# Patient Record
Sex: FEMALE | Race: WHITE | NOT HISPANIC OR LATINO | Employment: FULL TIME | URBAN - METROPOLITAN AREA
[De-identification: names, ages, dates, MRNs, and addresses within clinical notes are randomized per-mention and may not be internally consistent; named-entity substitution may affect disease eponyms.]

---

## 2019-07-02 ENCOUNTER — OFFICE VISIT (OUTPATIENT)
Dept: PULMONOLOGY | Facility: CLINIC | Age: 54
End: 2019-07-02

## 2019-07-02 ENCOUNTER — DOCUMENTATION (OUTPATIENT)
Dept: ONCOLOGY | Facility: CLINIC | Age: 54
End: 2019-07-02

## 2019-07-02 VITALS
BODY MASS INDEX: 24.99 KG/M2 | WEIGHT: 150 LBS | OXYGEN SATURATION: 94 % | HEIGHT: 65 IN | SYSTOLIC BLOOD PRESSURE: 120 MMHG | HEART RATE: 62 BPM | TEMPERATURE: 97.7 F | DIASTOLIC BLOOD PRESSURE: 80 MMHG

## 2019-07-02 DIAGNOSIS — R91.1 NODULE OF RIGHT LUNG: ICD-10-CM

## 2019-07-02 DIAGNOSIS — R91.8 ABNORMAL CT LUNG SCREENING: Primary | ICD-10-CM

## 2019-07-02 PROCEDURE — 94726 PLETHYSMOGRAPHY LUNG VOLUMES: CPT | Performed by: INTERNAL MEDICINE

## 2019-07-02 PROCEDURE — 99204 OFFICE O/P NEW MOD 45 MIN: CPT | Performed by: INTERNAL MEDICINE

## 2019-07-02 PROCEDURE — 94729 DIFFUSING CAPACITY: CPT | Performed by: INTERNAL MEDICINE

## 2019-07-02 PROCEDURE — 94375 RESPIRATORY FLOW VOLUME LOOP: CPT | Performed by: INTERNAL MEDICINE

## 2019-07-02 RX ORDER — ESTRADIOL 0.1 MG/D
1 FILM, EXTENDED RELEASE TRANSDERMAL 2 TIMES WEEKLY
COMMUNITY
End: 2020-10-15

## 2019-07-02 RX ORDER — ALBUTEROL SULFATE 90 UG/1
AEROSOL, METERED RESPIRATORY (INHALATION)
COMMUNITY
Start: 2019-04-20 | End: 2023-03-03

## 2019-07-02 RX ORDER — MONTELUKAST SODIUM 10 MG/1
TABLET ORAL
COMMUNITY
Start: 2019-06-29 | End: 2020-10-15 | Stop reason: SDDI

## 2019-07-02 RX ORDER — TESTOSTERONE GEL, 1% 10 MG/G
50 GEL TRANSDERMAL DAILY
COMMUNITY
End: 2023-03-03

## 2019-07-02 NOTE — PROGRESS NOTES
Met patient in lung nodule clinic with Dr. Whiting. She has minimal smoking history and has not smoked in many years. She has had significant STAUFFER for several years now. States she cannot go up one flight of stairs without being SOA. She has family history of cardiac disease but none known in herself. CT chest done revealed 9mm right upper lobe nodule. Scans and PFT's reviewed. Per Dr. Whiting CT guided biopsy and echo. Patient requests biopsy to be done at [place], will try to accommodate. Introduced lung navigator role and provided contact information. She v/u and agreeable to plan.

## 2019-07-02 NOTE — PROGRESS NOTES
Shahida Cutler is a 53 y.o. female here for evaluation of   Chief Complaint   Patient presents with   • Shortness of Breath       Problem list:  1. Right upper lobe nodule, 0.9 cm  2. Former tobacco, quit 2011  3. Perimenopausal  4. Multiple food allergies  5. Obstructive sleep apnea on CPAP  6. History of muscle biopsy 2004  7. Radial keratotomy  8. Colonoscopy 2017  9. No known drug allergies    History of Present Illness    53-year-old woman referred to evaluate a right upper lobe lung nodule.  She has been short of breath for many years.  She saw Dr. Milan Dominguez around 2011 and she thinks underwent a cardiopulmonary exercise test.  Work-up was negative.  She quit smoking at that time after smoking 10 years, 1 pack/day.  She denies a chronic cough or wheezing.  She denies a history of TB exposure.  She has a radon gas detector in her basement and it is negative.  Her mother worked at a power plant that had asbestos but she is never been exposed.  She complains of dyspnea walking up one flight of stairs or doing light yard work.  She was given an inhaler and some Singulair without benefit.  She was a very active teenager able to do cheerleading.  There is a possible history of asthma as a teen.  She denies weight loss or fever.  She wears CPAP for sleep apnea without excessive daytime somnolence    Review of Systems   Respiratory: Positive for shortness of breath.    Musculoskeletal: Positive for back pain.   Allergic/Immunologic: Positive for environmental allergies.   All other systems reviewed and are negative.        Current Outpatient Medications:   •  estradiol (MINIVELLE, VIVELLE-DOT) 0.1 MG/24HR patch, Place 1 patch on the skin as directed by provider 2 (Two) Times a Week., Disp: , Rfl:   •  testosterone (ANDROGEL) 50 MG/5GM (1%) gel gel, Place 50 mg on the skin as directed by provider Daily., Disp: , Rfl:   •  albuterol sulfate  (90 Base) MCG/ACT inhaler, , Disp: , Rfl:   •  montelukast  "(SINGULAIR) 10 MG tablet, , Disp: , Rfl:   •  progesterone (PROMETRIUM) 100 MG capsule, Take 100 mg by mouth Daily., Disp: , Rfl: 0    Past Medical History:   Diagnosis Date   • Vision abnormalities      History reviewed. No pertinent surgical history.  Social History     Socioeconomic History   • Marital status:      Spouse name: Not on file   • Number of children: Not on file   • Years of education: Not on file   • Highest education level: Not on file   Tobacco Use   • Smoking status: Former Smoker     Packs/day: 1.00     Years: 10.00     Pack years: 10.00     Types: Cigarettes     Last attempt to quit: 2011     Years since quittin.5   • Smokeless tobacco: Never Used   Substance and Sexual Activity   • Alcohol use: Yes     Alcohol/week: 6.0 oz     Types: 10 Cans of beer per week     Frequency: Never   • Drug use: No   • Sexual activity: Defer     Family History   Problem Relation Age of Onset   • Hypertension Mother    • Heart failure Father    • Hypertension Sister    • Cancer Brother    • Hypertension Brother    • Hypertension Brother      Blood pressure 120/80, pulse 62, temperature 97.7 °F (36.5 °C), height 163.8 cm (64.5\"), weight 68 kg (150 lb), SpO2 94 %.    Physical Exam   Constitutional: She is oriented to person, place, and time. She appears well-developed and well-nourished. No distress.   HENT:   Head: Normocephalic and atraumatic.   Nose: Nose normal.   Mouth/Throat: Oropharynx is clear and moist. No oropharyngeal exudate.   Eyes: Pupils are equal, round, and reactive to light. No scleral icterus.   Neck: No JVD present. No tracheal deviation present. No thyromegaly present.   Cardiovascular: Normal rate, regular rhythm and normal heart sounds.   No murmur heard.  Pulmonary/Chest: Effort normal and breath sounds normal. No respiratory distress. She has no wheezes.   Abdominal: Soft. Bowel sounds are normal. She exhibits no distension. There is no tenderness.   Musculoskeletal: She " exhibits no edema.   Lymphadenopathy:     She has no cervical adenopathy.   Neurological: She is alert and oriented to person, place, and time.   Skin: Skin is warm and dry.   Psychiatric: She has a normal mood and affect.         PFTs:  FVC 2.91 L, 82%, FEV1 2.23 L, 79% ratio 77%, total lung capacity 3.87 L, 77%, diffusion capacity 17.3, 81%    Radiology:  CT scan of the chest June 19, 2019 and [place] 0.9 cm nodule right upper lobe, bulky calcified adenopathy in the mediastinum and hilum, scattered calcified granulomas  Lab:    Shahida was seen today for shortness of breath.    Diagnoses and all orders for this visit:    Abnormal CT lung screening  -     Pulmonary Function Test    Nodule of right lung, RUL        Discussion:   #1 right upper lobe nodule, 0.9 cm.  This is likely a granuloma however it is noncalcified and she is a former smoker.  It is also located in the upper lobe.  Therefore we should probably proceed with needle biopsy.  She clearly has signs of granulomatous disease with densely calcified lymph nodes and some scattered calcified granulomas but none this size.  It does not have the configuration of a hamartoma.  She does not have a personal history of cancer so a solitary metastatic lesion would be unlikely.    #2 shortness of air with exertion, I do not see fibrosing mediastinitis on her CT scan but that is in the differential.  She has not had an echocardiogram to evaluate LV function.  Pulmonary function tests are unrevealing.  She likely needs a work-up for pulmonary hypertension      CBC, pro time, BMP  Schedule transthoracic needle biopsy  Echocardiogram to evaluate dyspnea  Follow-up 1 week post biopsy    Rosalina Whiting MD

## 2019-07-05 ENCOUNTER — PREP FOR SURGERY (OUTPATIENT)
Dept: OTHER | Facility: HOSPITAL | Age: 54
End: 2019-07-05

## 2019-07-05 DIAGNOSIS — R06.09 DOE (DYSPNEA ON EXERTION): Primary | ICD-10-CM

## 2019-07-05 DIAGNOSIS — R91.1 LUNG NODULE: Primary | ICD-10-CM

## 2019-07-08 DIAGNOSIS — R91.1 NODULE OF RIGHT LUNG: Primary | ICD-10-CM

## 2019-07-08 DIAGNOSIS — R91.8 ABNORMAL CT LUNG SCREENING: ICD-10-CM

## 2019-07-09 ENCOUNTER — APPOINTMENT (OUTPATIENT)
Dept: CARDIOLOGY | Facility: HOSPITAL | Age: 54
End: 2019-07-09

## 2019-07-10 ENCOUNTER — TELEPHONE (OUTPATIENT)
Dept: PULMONOLOGY | Facility: CLINIC | Age: 54
End: 2019-07-10

## 2019-07-10 NOTE — TELEPHONE ENCOUNTER
Patient requested Trans Echo & CT Needle Biopsy be done @ [facility]. Dr Smith wants to see her back 1 week after CT. When trying to schedule the follow up appointment, the patient refused to see an APRN, stating that her condition warrants seeing the Dr, not a PA. It was explained that once a patient is seen by a Dr in our office, that is the Dr who continues to care for them. She was advised that our APRN's work very closely with the Drs and they would take good care of her. Patient still refused to schedule follow up with APRN. She was willing to schedule the next available appointment with Dr Smith in Sept. She was added to the wait list, in case something opens up sooner. Sent in basket message to Dr Smith for further advise.

## 2019-07-19 DIAGNOSIS — R06.09 DOE (DYSPNEA ON EXERTION): ICD-10-CM

## 2019-07-19 DIAGNOSIS — R91.1 LUNG NODULE: ICD-10-CM

## 2019-07-25 ENCOUNTER — TELEPHONE (OUTPATIENT)
Dept: PULMONOLOGY | Facility: CLINIC | Age: 54
End: 2019-07-25

## 2019-07-25 NOTE — TELEPHONE ENCOUNTER
Patient called requesting results of needle biopsy and echocardiogram, please advise at your convenience.

## 2019-09-17 ENCOUNTER — TELEPHONE (OUTPATIENT)
Dept: PULMONOLOGY | Facility: CLINIC | Age: 54
End: 2019-09-17

## 2019-10-01 ENCOUNTER — OFFICE VISIT (OUTPATIENT)
Dept: PULMONOLOGY | Facility: CLINIC | Age: 54
End: 2019-10-01

## 2019-10-01 VITALS
SYSTOLIC BLOOD PRESSURE: 102 MMHG | BODY MASS INDEX: 25.06 KG/M2 | TEMPERATURE: 97.8 F | WEIGHT: 150.4 LBS | OXYGEN SATURATION: 95 % | HEIGHT: 65 IN | DIASTOLIC BLOOD PRESSURE: 70 MMHG | HEART RATE: 62 BPM

## 2019-10-01 DIAGNOSIS — R06.09 DOE (DYSPNEA ON EXERTION): ICD-10-CM

## 2019-10-01 DIAGNOSIS — R91.1 NODULE OF RIGHT LUNG: Primary | ICD-10-CM

## 2019-10-01 PROCEDURE — 99213 OFFICE O/P EST LOW 20 MIN: CPT | Performed by: INTERNAL MEDICINE

## 2019-10-01 RX ORDER — ESTRADIOL 0.07 MG/D
FILM, EXTENDED RELEASE TRANSDERMAL
Refills: 5 | COMMUNITY
Start: 2019-09-19 | End: 2020-10-15 | Stop reason: ALTCHOICE

## 2019-10-01 NOTE — PROGRESS NOTES
Shahida Cutler is a 54 y.o. female here for evaluation of lung nodule      Problem list:  1. Right upper lobe nodule, 0.9 cm  2. Transthoracic needle biopsy right upper lobe nodule, July 2019, rare benign bronchial cells and macrophages  3. Former tobacco, quit 2011  4. Perimenopausal  5. Multiple food allergies  6. Obstructive sleep apnea on CPAP  7. History of muscle biopsy 2004  8. Radial keratotomy  9. Colonoscopy 2017  10. No known drug allergies    History of Present Illness    54-year-old woman referred for a right upper lobe nodule.  She underwent a transthoracic needle biopsy and.  Follow-up CT scan is stable.  She has a remote history of smoking in 2011.  She does not have wheezing but complains of exertional dyspnea.  11 she underwent a cardiopulmonary exercise test that was negative.  She has a known history of sleep apnea for which she wears CPAP nightly with good symptom control.  Has no excessive somnolent rested in the mornings.  Exercise is limited by lumbar disc disease    Review of Systems   Respiratory: Positive for apnea and shortness of breath.    Musculoskeletal: Positive for back pain.   Allergic/Immunologic: Positive for environmental allergies and food allergies.         Current Outpatient Medications:   •  albuterol sulfate  (90 Base) MCG/ACT inhaler, , Disp: , Rfl:   •  estradiol (MINIVELLE, VIVELLE-DOT) 0.1 MG/24HR patch, Place 1 patch on the skin as directed by provider 2 (Two) Times a Week., Disp: , Rfl:   •  montelukast (SINGULAIR) 10 MG tablet, , Disp: , Rfl:   •  progesterone (PROMETRIUM) 100 MG capsule, Take 100 mg by mouth Daily., Disp: , Rfl: 0  •  testosterone (ANDROGEL) 50 MG/5GM (1%) gel gel, Place 50 mg on the skin as directed by provider Daily., Disp: , Rfl:   •  estradiol (MINIVELLE, VIVELLE-DOT) 0.075 MG/24HR patch, APPLY 0.25g (ONE click) EVERY DAY AS DIRECTED, Disp: , Rfl: 5    Past Medical History:   Diagnosis Date   • H/O degenerative disc disease    • Hot flash  "not due to menopause    • Multiple food allergies    • MAE on CPAP    • Vision abnormalities      Past Surgical History:   Procedure Laterality Date   • COLONOSCOPY     • EYE RADIAL KERATOTOMY     • MUSCLE BIOPSY       Social History     Socioeconomic History   • Marital status:      Spouse name: Not on file   • Number of children: 0   • Years of education: Not on file   • Highest education level: Not on file   Occupational History   • Occupation: realator administration   Tobacco Use   • Smoking status: Former Smoker     Packs/day: 1.00     Years: 10.00     Pack years: 10.00     Types: Cigarettes     Last attempt to quit: 2011     Years since quittin.7   • Smokeless tobacco: Never Used   Substance and Sexual Activity   • Alcohol use: Yes     Alcohol/week: 6.0 oz     Types: 10 Cans of beer per week     Frequency: Never   • Drug use: No   • Sexual activity: Defer     Family History   Problem Relation Age of Onset   • Hypertension Mother    • Heart failure Father    • Hypertension Sister    • Cancer Brother    • Hypertension Brother    • Hypertension Brother      Blood pressure 102/70, pulse 62, temperature 97.8 °F (36.6 °C), height 163.8 cm (64.5\"), weight 68.2 kg (150 lb 6.4 oz), SpO2 95 %.    Physical Exam   Constitutional: She is oriented to person, place, and time. She appears well-developed and well-nourished. No distress.   HENT:   Head: Normocephalic and atraumatic.   Mouth/Throat: Oropharynx is clear and moist. No oropharyngeal exudate.   Eyes: EOM are normal. Pupils are equal, round, and reactive to light. No scleral icterus.   Neck: No JVD present. No tracheal deviation present. No thyromegaly present.   Cardiovascular: Normal rate, regular rhythm and normal heart sounds.   No murmur heard.  Pulmonary/Chest: Effort normal and breath sounds normal. No respiratory distress. She has no wheezes.   Abdominal: Soft. Bowel sounds are normal. There is no tenderness.   Musculoskeletal: She " exhibits no edema.   Lymphadenopathy:     She has no cervical adenopathy.   Neurological: She is alert and oriented to person, place, and time.   Skin: Skin is warm and dry.   Psychiatric: She has a normal mood and affect.         PFTs:    Radiology:    Echocardiogram done at [facility] normal left ventricular ejection fraction 55%, RV systolic pressure 21.8, aortic valve normal, trace mitral regurgitation    Lab:  CT-guided needle biopsy right upper lobe nodule, [facility], July 2019 pathology rare bronchial cells and macrophages,      Diagnoses and all orders for this visit:    Nodule of right lung, RUL    STAUFFER (dyspnea on exertion)        Discussion:   #1 right upper lobe nodule, benign needle biopsy.  Needle biopsies can missed 2 to 5% to keep an eye on it with an annual CT scan.  He does have evidence of granulomatous disease and I clinically suspect a noncalcified granuloma.    #2 exertional dyspnea, unchanged but not improved.  Pulmonary function test showed no obstruction and CT scan shows no interstitial disease.  Echocardiogram shows normal left ventricular function and no pulmonary hypertension or valvular disease.  Therefore I clinically suspect deconditioning.    Follow-up in 1 year with CT scan of the chest    Rosalina Whiting MD

## 2020-05-22 DIAGNOSIS — R91.1 NODULE OF RIGHT LUNG: Primary | ICD-10-CM

## 2020-09-16 DIAGNOSIS — R91.1 NODULE OF RIGHT LUNG: ICD-10-CM

## 2020-09-25 ENCOUNTER — TELEPHONE (OUTPATIENT)
Dept: PULMONOLOGY | Facility: CLINIC | Age: 55
End: 2020-09-25

## 2020-10-15 ENCOUNTER — OFFICE VISIT (OUTPATIENT)
Dept: PULMONOLOGY | Facility: CLINIC | Age: 55
End: 2020-10-15

## 2020-10-15 VITALS
HEART RATE: 74 BPM | HEIGHT: 65 IN | TEMPERATURE: 97.8 F | OXYGEN SATURATION: 98 % | SYSTOLIC BLOOD PRESSURE: 128 MMHG | BODY MASS INDEX: 26.16 KG/M2 | WEIGHT: 157 LBS | DIASTOLIC BLOOD PRESSURE: 80 MMHG

## 2020-10-15 DIAGNOSIS — R06.09 DOE (DYSPNEA ON EXERTION): ICD-10-CM

## 2020-10-15 DIAGNOSIS — Z00.6 EXAMINATION FOR NORMAL COMPARISON FOR CLINICAL RESEARCH: Primary | ICD-10-CM

## 2020-10-15 DIAGNOSIS — R91.1 NODULE OF RIGHT LUNG: Primary | ICD-10-CM

## 2020-10-15 PROCEDURE — 99213 OFFICE O/P EST LOW 20 MIN: CPT | Performed by: INTERNAL MEDICINE

## 2020-10-15 RX ORDER — METHOCARBAMOL 750 MG/1
750 TABLET, FILM COATED ORAL 4 TIMES DAILY PRN
COMMUNITY

## 2020-10-15 RX ORDER — LEVOTHYROXINE SODIUM 0.03 MG/1
TABLET ORAL
COMMUNITY
Start: 2020-10-09

## 2020-10-15 RX ORDER — ACYCLOVIR 200 MG/1
CAPSULE ORAL DAILY PRN
COMMUNITY

## 2020-10-15 NOTE — PROGRESS NOTES
Shahida Cutler is a 55 y.o. female here for evaluation of       Problem list:  1. Right upper lobe nodule, 0.9 cm  2. Former tobacco, quit 2011  3. Hypothyroid  4. dyslipidemia  5. Perimenopausal  6. Multiple food allergies  7. Obstructive sleep apnea on CPAP  8. History of muscle biopsy 2004  9. Radial keratotomy  10. Colonoscopy 2017  11. No known drug allergies    History of Present Illness    55-year-old woman seen 1 year ago for right upper lobe nodule and some exertional dyspnea.  She had a extensive work-up for shortness of air in 2011 by Dr. Milan Dominguez including a CPX that was negative.  She did quit smoking cigarettes at that time after smoking 1 pack/day for 10 years.  She does wear CPAP for sleep apnea.  She did undergo a needle biopsy of the right upper lobe that was negative for cancer in 2019.  She continues to feel some shortness of air with exertion.  She feels as if her chest is constricted when she exerts.  It is no worse but it is also no better.  She is was recently started on some thyroid replacement because of fatigue and weight gain.  She gets shortness of air if she is exposed to perfumes, chemical smells.    Review of Systems   Constitutional: Positive for activity change and fatigue.   Respiratory: Positive for shortness of breath. Negative for cough and wheezing.    Cardiovascular: Negative for chest pain.   Psychiatric/Behavioral: Positive for sleep disturbance.         Current Outpatient Medications:   •  acyclovir (ZOVIRAX) 200 MG capsule, Take  by mouth Daily As Needed., Disp: , Rfl:   •  albuterol sulfate  (90 Base) MCG/ACT inhaler, , Disp: , Rfl:   •  levothyroxine (SYNTHROID, LEVOTHROID) 25 MCG tablet, , Disp: , Rfl:   •  methocarbamol (ROBAXIN) 750 MG tablet, Take 750 mg by mouth 4 (Four) Times a Day As Needed for Muscle Spasms., Disp: , Rfl:   •  testosterone (ANDROGEL) 50 MG/5GM (1%) gel gel, Place 50 mg on the skin as directed by provider Daily., Disp: , Rfl:  "    Past Medical History:   Diagnosis Date   • H/O degenerative disc disease    • Hot flash not due to menopause    • Multiple food allergies    • MAE on CPAP    • Vision abnormalities      Past Surgical History:   Procedure Laterality Date   • COLONOSCOPY     • EYE RADIAL KERATOTOMY     • MUSCLE BIOPSY       Social History     Socioeconomic History   • Marital status:      Spouse name: Not on file   • Number of children: 0   • Years of education: Not on file   • Highest education level: Not on file   Occupational History   • Occupation: realator administration   Tobacco Use   • Smoking status: Former Smoker     Packs/day: 1.00     Years: 10.00     Pack years: 10.00     Types: Cigarettes     Quit date: 2011     Years since quittin.7   • Smokeless tobacco: Never Used   Substance and Sexual Activity   • Alcohol use: Yes     Alcohol/week: 10.0 standard drinks     Types: 10 Cans of beer per week     Frequency: Never   • Drug use: No   • Sexual activity: Defer     Family History   Problem Relation Age of Onset   • Hypertension Mother    • Heart failure Father    • Hypertension Sister    • Cancer Brother    • Hypertension Brother    • Hypertension Brother      Blood pressure 128/80, pulse 74, temperature 97.8 °F (36.6 °C), height 163.8 cm (64.5\"), weight 71.2 kg (157 lb), SpO2 98 %, not currently breastfeeding.    Physical Exam  Constitutional:       Appearance: Normal appearance.   HENT:      Head: Normocephalic and atraumatic.      Nose:      Comments: masked  Eyes:      Extraocular Movements: Extraocular movements intact.      Pupils: Pupils are equal, round, and reactive to light.   Cardiovascular:      Rate and Rhythm: Normal rate and regular rhythm.      Heart sounds: Normal heart sounds. No murmur.   Pulmonary:      Effort: Pulmonary effort is normal.      Breath sounds: No rhonchi or rales.      Comments: Some upper airway wheeze with forced expirations  Abdominal:      General: Bowel sounds " are normal. There is no distension.      Palpations: Abdomen is soft.      Tenderness: There is no abdominal tenderness.   Skin:     General: Skin is warm and dry.   Psychiatric:         Mood and Affect: Mood normal.           PFTs:  July 2019, FVC 2.91 L, 82%, FEV1 2.23 L, 79% ratio 77%, total lung capacity 3.87 L, 77%, diffusion capacity 17.3, 81%    Radiology:  September 14, 2020, CT scan, [facility], nodules in the right upper lobe consistent with old granulomatous disease, no new nodules.  Persistent mediastinal adenopathy in the AP window.    Echocardiogram performed at [facility] July 18, 2019, normal left ventricular size and thickness.  EF 55%, RV systolic pressure 21.8, normal-appearing valves with trace mitral regurgitation, no aortic valve disease, no pericardial effusion    Lab:  July 28, 2020, cholesterol 201, triglycerides 64, , HDL 78  Glucose 96, BUN 14, creatinine 1.1, sodium 142, potassium 4.5, liver function test normal  White count 5.7, hemoglobin 13.4, platelet count 206, absolute eosinophil count 188    Needle biopsy right upper lobe nodule, rare bronchial cells and macrophages, no malignancy    Diagnoses and all orders for this visit:    1. Nodule of right lung, RUL (Primary)    2. STAUFFER (dyspnea on exertion)        Discussion:     #1 dyspnea on exertion, previous pulmonary function tests have been normal.  I suspect that she may simply have deconditioning and we discussed starting an exercise program.  She did have an echocardiogram a year ago that revealed normal left ventricular function with an EF of 55% and no significant valvular disease.  CT scan of the chest reveals calcified granulomatous changes in the right upper lobe as well as some mediastinal adenopathy in the left aortopulmonary window.  These findings were stable compared to previous scans.  She does have difficulty around perfumes, scented candles, chemical smells.  This might indicate vocal  cord dysfunction and if she has persistent symptoms she might benefit from speech therapy evaluation.  With stability of her CT scan over 2 years and no change in her symptoms I think I can follow her up on an as-needed basis.    Rosalina Whiting MD

## 2023-02-22 DIAGNOSIS — Z00.6 EXAMINATION FOR NORMAL COMPARISON OR CONTROL IN CLINICAL RESEARCH: Primary | ICD-10-CM

## 2023-03-03 ENCOUNTER — OFFICE VISIT (OUTPATIENT)
Dept: PULMONOLOGY | Facility: CLINIC | Age: 58
End: 2023-03-03
Payer: COMMERCIAL

## 2023-03-03 VITALS
BODY MASS INDEX: 26.16 KG/M2 | TEMPERATURE: 96.4 F | SYSTOLIC BLOOD PRESSURE: 120 MMHG | HEIGHT: 65 IN | RESPIRATION RATE: 14 BRPM | WEIGHT: 157 LBS | DIASTOLIC BLOOD PRESSURE: 80 MMHG | OXYGEN SATURATION: 100 % | HEART RATE: 70 BPM

## 2023-03-03 DIAGNOSIS — D71 GRANULOMATOUS DISEASE, CHRONIC: Primary | ICD-10-CM

## 2023-03-03 PROCEDURE — 99213 OFFICE O/P EST LOW 20 MIN: CPT | Performed by: INTERNAL MEDICINE

## 2023-03-03 RX ORDER — PROGESTERONE 100 MG/1
CAPSULE ORAL
COMMUNITY
Start: 2017-07-27

## 2023-03-03 RX ORDER — TRIAMCINOLONE ACETONIDE 1 MG/G
CREAM TOPICAL
COMMUNITY
Start: 2023-02-11

## 2023-03-03 NOTE — PROGRESS NOTES
Shahida Cutler is a 57 y.o. female here for evaluation of   Chief Complaint   Patient presents with   • Nodule of right lung, RUL       Problem list:  1. Ca++ granulomatous changes  2. Former tobacco, quit 2011  3. Hypothyroid  4. dyslipidemia  5. Perimenopausal  6. Multiple food allergies  7. Obstructive sleep apnea on CPAP  8. History of muscle biopsy 2004  9. Radial keratotomy  10. Colonoscopy 2017  11. No known drug allergies    History of Present Illness    57-year-old woman seen in 2011 by Dr. Milan Dominguez for exertional dyspnea.  She had an extensive work-up including a CPX that was negative.  She did quit smoking at that time after smoking 1 pack/day for 10 years.   She was then found to have a right upper lobe nodule 0.9 cm and underwent a needle biopsy in 2019 that was negative for cancer.   (end copied text)    Denies significant STAUFFER. But can have some seasonal exertional symptoms. She has never had asthma and does not wheeze.   Wears CPAP nightly, and feels rested.      Review of Systems   HENT: Negative for congestion and postnasal drip.    Respiratory: Positive for chest tightness. Negative for cough.          Current Outpatient Medications:   •  acyclovir (ZOVIRAX) 200 MG capsule, Take  by mouth Daily As Needed., Disp: , Rfl:   •  levothyroxine (SYNTHROID, LEVOTHROID) 25 MCG tablet, , Disp: , Rfl:   •  methocarbamol (ROBAXIN) 750 MG tablet, Take 1 tablet by mouth 4 (Four) Times a Day As Needed for Muscle Spasms., Disp: , Rfl:   •  Progesterone (PROMETRIUM) 100 MG capsule, , Disp: , Rfl:   •  triamcinolone (KENALOG) 0.1 % cream, , Disp: , Rfl:     Past Medical History:   Diagnosis Date   • H/O degenerative disc disease    • Hot flash not due to menopause    • Multiple food allergies    • MAE on CPAP    • Vision abnormalities      Past Surgical History:   Procedure Laterality Date   • COLONOSCOPY  2017   • EYE RADIAL KERATOTOMY     • LUNG BIOPSY  2018   • MUSCLE BIOPSY  2004     Social History  "    Socioeconomic History   • Marital status:    • Number of children: 0   Tobacco Use   • Smoking status: Former     Packs/day: 1.00     Years: 10.00     Pack years: 10.00     Types: Cigarettes     Quit date: 2011     Years since quittin.1   • Smokeless tobacco: Never   Vaping Use   • Vaping Use: Never used   Substance and Sexual Activity   • Alcohol use: Yes     Alcohol/week: 10.0 standard drinks     Types: 10 Cans of beer per week   • Drug use: No   • Sexual activity: Defer     Family History   Problem Relation Age of Onset   • Hypertension Mother    • Heart failure Father    • Hypertension Sister    • Cancer Brother    • Hypertension Brother    • Hypertension Brother      Blood pressure 120/80, pulse 70, temperature 96.4 °F (35.8 °C), temperature source Infrared, resp. rate 14, height 163.8 cm (64.5\"), weight 71.2 kg (157 lb), SpO2 100 %, not currently breastfeeding.    Physical Exam  Cardiovascular:      Rate and Rhythm: Normal rate and regular rhythm.   Pulmonary:      Effort: Pulmonary effort is normal.      Breath sounds: No wheezing or rhonchi.   Neurological:      Mental Status: She is alert.           PFTs:    2019, FVC 2.91 L, 82%, FEV1 2.23 L, 79% ratio 77%, total lung capacity 3.87 L, 77%, diffusion capacity 17.3, 81%    Radiology:    2023, CT scan, [facility], persistent conglomeration of nodules in the right upper lobe do not appear clinically changed    2020, CT scan at [facility], nodules in the right upper lobe consistent with old granulomatous disease, no new nodules, persistent mediastinal adenopathy in the AP window    Echocardiogram 2019 EF 55%, normal RV systolic pressure, trace MR    Lab:    CT-guided needle biopsy right upper lobe nodule, [facility], 2019 pathology rare bronchial cells and macrophages,    Diagnoses and all orders for this visit:    1. Granulomatous disease, chronic (HCC) " (Primary)        Discussion:     57-year-old woman, remote smoker here for follow-up of a lung nodule.  I no longer see a discrete lung nodule in the right upper lobe.  She has fairly significant bilateral calcified granulomatous changes with some large calcified lymph nodes in the mediastinum.  Report indicates some emphysema but I clinically do not see emphysema on her CT scan.  She does have mild linear scarring in the lower lobes at the bases.  I did personally look at her CT scans with the patient.  In 2019 she had a normal FEV1 2.23 L, 79%, no air trapping and preserved diffusion capacity.   I did discuss her calcified granulomatous changes.  They will not lead to fibrosis or progressive lung disease.  However she does have some very large calcified lymph nodes in the hilum and mediastinum.  Occasionally these will erode into the airway and cause hemoptysis or broncholiths.   She has a 10-pack-year history of smoking, quitting in 2011 without evidence of emphysema.  She does not take immunosuppressive medications, and does not have a personal history of cancer.  I would consider her low risk for lung cancer.    She can see me back on an as-needed basis if symptoms worsen      Rosalina Whiting MD

## 2023-08-04 PROBLEM — M51.37 DEGENERATION OF LUMBAR OR LUMBOSACRAL INTERVERTEBRAL DISC: Status: ACTIVE | Noted: 2023-08-04

## 2023-08-04 PROBLEM — M51.379 DEGENERATION OF LUMBAR OR LUMBOSACRAL INTERVERTEBRAL DISC: Status: ACTIVE | Noted: 2023-08-04

## 2023-08-04 PROBLEM — M47.816 SPONDYLOSIS OF LUMBAR REGION WITHOUT MYELOPATHY OR RADICULOPATHY: Status: ACTIVE | Noted: 2023-08-04

## 2023-08-04 NOTE — PROGRESS NOTES
"Chief Complaint: \"Lower back and right lower extremity pain\"          History of Present Illness:   Patient: Ms. Shahida Cutler, 57 y.o. female   Referring Physician: Dr. Dawit Sainz  Reason for Referral: Consultation for chronic intractable lower back pain.   Pain History: Patient reports a longstanding history of chronic intractable lower back pain, which began without incident. Pain has progressed in intensity over the past years. Shahida Cutler underwent follow-up consultation with Dr. Isaac Bauer's PA on 06/03/2023. Patient reports onset of lower back pain in 2016 without inciting.  She reported that in 2016, she saw a surgeon who recommended lumbar fusion. On May 15, 2023, she underwent some sort of a lumbar steroid shot by Dr Goldsmith and reported 0% improvement. Therefore, Dr. Goldsmith proposed L3-L4, L4-L5 ALIF/PSF/laminectomy. Shahida Cutler underwent second opinion neurosurgical consultation with Dr. Dawit Sainz on 07/17/2023, and was found not to be a surgical candidate and recommended consideration for spinal cord stimulation. Patient presents with a longstanding history of lower back pain and intermittent right lower extremity pain. She does not tolerate sitting for prolong periods of  time. Dr. Sainz recommended a trial of interventional pain management measures with a pain management specialist with consideration for a spinal cord stimulator device. Patient has failed to obtain pain relief with conservative measures for the past several years including oral analgesics, topical analgesics, ice, heat, physical therapy (ongoing, 2 x per week), physical therapist directed home exercise program HEP (ongoing), chiropractic therapy (ongoing, monthly visits), therapeutic massage,  TENs, to name a few  Pain Description: Constant lower back pain with intermittent exacerbation, described as muscle pain, aching, dull, and sharp sensation.   Radiation of Pain: The pain radiates into the right " lower extremity down to her calf  Pain intensity: 6/10  Average pain intensity last week: 6/10  Pain intensity ranges from: 3/10 to 7/10  Aggravating factors: Pain increases with protracted sitting, standing. Patient denies neurogenic claudication.    Alleviating factors: Pain decreases with lying down  Associated Symptoms:   Patient reports pain, but denies numbness or weakness in the right lower extremity.   Patient denies any new bladder or bowel problems.   Patient reports difficulties with her balance but denies recent falls.   Pain interferes with general activities and affects patient's quality of life  Pain interferes with sleep causing sleep fragmentation   Muscle spasms: No  Stiffness: LB    Review of previous therapies and additional medical records:  Shahida Cutler has already failed the following measures, including:   Conservative Measures: Oral analgesics, topical analgesics, ice, heat, physical therapy (ongoing, 2 x per week), physical therapist directed home exercise program HEP (ongoing), chiropractic therapy (ongoing, monthly visits), therapeutic massage,  TENs  Interventional Measures: One lumbar injection by Dr. Serrano (5/2023), no relief  Surgical Measures: No history of previous cervical spine, lumbar spine or hip surgery   Shahida Cutler underwent neurosurgical consultation with Dr. Dawit Sainz on 07/17/2023, and was found not to be a surgical candidate.  Shahida Cutler is a healthy adult other than MAE on CPAP  In terms of current analgesics, Shahida Cutler takes: OTC  I have reviewed Ed Report consistent with medication reconciliation.  SOAPP/ORT: Low Risk     PHQ-2 Depression Screening  Little interest or pleasure in doing things? 0-->not at all   Feeling down, depressed, or hopeless? 0-->not at all   PHQ-2 Total Score 0     Pain Self-Efficacy Questionnaire (PSEQ)  ITEM 08-08 2023        I can enjoy things despite the pain. 5        I can do most of the household chores  (tidying up, washing dishes, etc), despite the pain. 5        I can socialize with my friends or family members as often as I used to do, despite the pain. 4        I can cope with my pain in most situations. 4        I can do some form of work, despite the pain (includes housework, paid, and unpaid work). 6        I can still do many of the things I enjoy doing, such as hobbies or leisure activity despite pain. 6        I can cope with my pain without medications. 5        I can accomplish most of my goals in life despite the pain. 5        I can live in a normal lifestyle, despite the pain. 4        I can gradually become more active, despite the pain. 5        TOTAL SCORE 49/60            Global Pain Scale 08-08 2023          Pain 12          Feelings 2          Clinical outcomes 8          Activities 0          GPS Total: 22              The [place] Back Pain Disability Scale   DATE 08-08 2023          Sleep through the night 5          Turn over in bed 2          Get out of bed 2          Make your bed 1          Put on socks (pantyhose) 2          Ride in a car 4          Sit in a chair for several hours 5          Stand up for 20-30 minutes 1          Climb one flight of stairs 1          Walk a few blocks (200-300 yards)  1          Walk several miles 5          Run one block (about 50 yards) 5          Take food out of the refrigerator 0          Reach up to high shelves 1          Move a chair 1          Pull or push heavy doors 2          Bend over to clean the bathtub 1          Throw a ball 0          Carry two bags of groceries 0          Lift and carry a heavy suitcase 3          Total score 42            Review of Diagnostic Studies:  I have independently reviewed and interpreted the images with the patient. I have also reviewed the reports.  MRI of the lumbar spine without contrast on 02/03/2023 revealed multilevel spondylosis.  Modic type I and II degenerative endplate changes at L3-L4 and L4-L5.   Grade 1 retrolisthesis L3 on L4 and L4 on L5.  The conus is seen at L1 and has unremarkable appearance.  Axial imaging:  T12-L1, L1-L2, L2-L3: No significant canal or foraminal stenosis  L3-L4: Grade 1 retrolisthesis, bilateral facet hypertrophy with joint effusions. There is abutment of the passing left L4 nerve root by small disc protrusion.  Mild left foraminal stenosis  L4-L5: Facet hypertrophy with bilateral joint effusions. Disc bulge, right paracentral disc extrusion with 4 mm cephalad disc migration with impingement of the passing right L5 nerve root in the lateral recess. Mild canal stenosis and mild foraminal stenosis  L5-S1: Disc bulge, facet hypertrophy.  No significant canal or foraminal stenosis    Review of Systems   HENT:  Positive for hearing loss.    Respiratory:  Positive for apnea and chest tightness.    Musculoskeletal:  Positive for arthralgias, back pain, joint swelling, myalgias, neck pain and neck stiffness.   Allergic/Immunologic: Positive for environmental allergies and food allergies.   Psychiatric/Behavioral:  Positive for sleep disturbance.    All other systems reviewed and are negative.      Patient Active Problem List   Diagnosis    Abnormal CT lung screening    Nodule of right lung, RUL    STAUFFER (dyspnea on exertion)    Granulomatous disease, chronic    Spondylosis of lumbar region without myelopathy or radiculopathy    Degeneration of lumbar or lumbosacral intervertebral disc    Lumbar radiculopathy    Spondylolisthesis of lumbar region    Connective tissue stenosis of neural canal of lumbar region    Physical deconditioning       Past Medical History:   Diagnosis Date    H/O degenerative disc disease     Hot flash not due to menopause     Multiple food allergies     MAE on CPAP     Vision abnormalities          Past Surgical History:   Procedure Laterality Date    COLONOSCOPY  2017    EYE RADIAL KERATOTOMY      LUNG BIOPSY  2018    MUSCLE BIOPSY  2004         Family History   Problem  "Relation Age of Onset    Hypertension Mother     Heart failure Father     Hypertension Sister     Cancer Brother     Hypertension Brother     Hypertension Brother          Social History     Socioeconomic History    Marital status:     Number of children: 0   Tobacco Use    Smoking status: Former     Packs/day: 1.00     Years: 10.00     Pack years: 10.00     Types: Cigarettes     Quit date: 2011     Years since quittin.6    Smokeless tobacco: Never   Vaping Use    Vaping Use: Never used   Substance and Sexual Activity    Alcohol use: Yes     Alcohol/week: 10.0 standard drinks     Types: 10 Cans of beer per week    Drug use: No    Sexual activity: Defer           Current Outpatient Medications:     acyclovir (ZOVIRAX) 200 MG capsule, Take  by mouth Daily As Needed., Disp: , Rfl:     cyanocobalamin 1000 MCG/ML injection, 1 mL 1 (One) Time Per Week., Disp: , Rfl:     estradiol (ESTRACE) 0.1 MG/GM vaginal cream, Insert 2 g into the vagina Daily., Disp: , Rfl:     levothyroxine (SYNTHROID, LEVOTHROID) 25 MCG tablet, Take 1 tablet by mouth Daily., Disp: , Rfl:     Progesterone (PROMETRIUM) 100 MG capsule, Take 150 mg by mouth Daily., Disp: , Rfl:       Allergies   Allergen Reactions    Eggs Or Egg-Derived Products Anaphylaxis    Corn-Containing Products GI Intolerance    Gluten Meal Irritability    Nuts Swelling    Soybean-Containing Drug Products GI Intolerance         /86   Pulse 74   Ht 160 cm (63\")   Wt 67.1 kg (148 lb)   LMP  (LMP Unknown)   SpO2 98%   BMI 26.22 kg/mý       Physical Exam:  Constitutional: Patient appears well-developed, well-nourished, well-hydrated, appears younger than stated age  HEENT: Head: Normocephalic and atraumatic  Eyes: Conjunctivae and lids are normal  Pupils: Equal, round, reactive to light  Peripheral vascular exam: Posterior tibialis: right 2+ and left 2+. Dorsalis pedis: right 2+ and left 2+. No edema  Musculoskeletal   Gait and station: Gait evaluation " demonstrated a normal gait. Able to walk on heels, toes, tandem walking   Lumbar spine:  Passive and active range of motion are full and without pain. Extension, flexion, lateral flexion, rotation of the lumbar spine did not increase or reproduce pain. Lumbar facet joint loading maneuvers are negative.   Les test and Gaenslen's test are negative   Piriformis maneuvers are negative   Hip joints: The range of motion of the hip joints is almost full and without pain   Palpation of the bilateral psoas tendons and iliopsoas bursas, unrevealing   Palpation of the bilateral greater trochanter, unrevealing   Examination of the Iliotibial band: unrevealing   Neurological:   Patient is alert and oriented to person, place, and time.   Speech: Normal.   Cortical function: Normal mental status.   Reflex Scores:  Right patellar: 1+  Left patellar: 1+  Right Achilles: 1+  Left Achilles: 1+  Motor strength: 5/5  Motor Tone: Normal  Involuntary movements: None.   Superficial/Primitive Reflexes: Primitive reflexes were absent.   Right Knapp: Absent  Left Knapp: Absent  Right ankle clonus: Absent  Left ankle clonus: Absent   Babinsky: Absent  Long tract signs: Negative. Straight leg raising test: Negative. Femoral stretch sign: Negative.   Sensory exam: Intact to light touch, intact pain and temperature sensation, intact vibration sensation and normal proprioception.   Coordination: Normal finger to nose and heel to shin. Normal balance. Romberg's sign: Negative    Skin and subcutaneous tissue: Skin is warm and intact. No rash noted. No cyanosis.   Psychiatric: Judgment and insight: Normal. Recent and remote memory: Intact. Mood and affect: Normal.         ASSESSMENT:   1. Lumbar radiculopathy    2. Lumbar stenosis without neurogenic claudication    3. Connective tissue stenosis of neural canal of lumbar region    4. Spondylolisthesis of lumbar region    5. Spondylosis of lumbar region without myelopathy or radiculopathy    6.  Degeneration of lumbar or lumbosacral intervertebral disc    7. Physical deconditioning    8. Preop testing        PLAN/MEDICAL DECISION MAKING:  Ms. Shahida Cutler, 57 y.o. female presents with a longstanding history of chronic intractable lower back and right lower extremity pain. Pain has progressed in intensity over the past years. Shahida Cutler underwent follow-up consultation with Dr. Isaac Bauer's PA on 06/03/2023. Patient reports onset of lower back pain in 2016 without inciting event. On May 15, 2023, she underwent a steroid shot by Dr Goldsmith and reported 0% improvement. Therefore, Dr. Goldsmith proposed L3-L4, L4-L5 ALIF/PSF/laminectomy. Shahida Cutler underwent second opinion neurosurgical consultation with Dr. Dawit Sainz on 07/17/2023, and was found not to be a surgical candidate. Shahida Cutler presents with a longstanding history of lower back pain and right lower extremity pain. Dr. Sainz recommended a trial of interventional pain management measures with consideration for a spinal cord stimulator device. .Shahida Cutler has failed to obtain pain relief with conservative measures for the past several years including oral analgesics, topical analgesics, ice, heat, physical therapy (ongoing, 2 x per week), physical therapist directed home exercise program HEP (ongoing), chiropractic therapy (ongoing, monthly visits), therapeutic massage,  TENs, to name a few. A comprehensive evaluation including history and physical exam along with pertinent physiologic and functional assessment was performed. Patient presents with intractable pain due to the diagnoses listed above. Patient has failed to respond to conservative modalities and previous minimally invasive interventional pain management measures, as referenced under HPI including the impact of patient's moderate-to-severe pain contributing to significant impairment in daily activities, ADLs, and a negative impact on quality of life.  Supporting diagnostic studies of patient's chronic pain condition have been reviewed. I have reviewed all available patient's medical records as well as previous therapies as referenced above. I had a lengthy conversation with Ms. Shahida Cutler regarding her chronic pain condition and potential therapeutic options including risks, benefits, alternative therapies, to name a few. We have discussed using a stepwise approach starting with the shortest or least intense level of treatment, care, or service as determined by the extent required to diagnose and or treat patient's condition. The treatments proposed are consistent with the patient's medical condition and are known to be as safe and effective by current guidelines and standard of care. There is no evidence of absolute contraindications for the proposed procedures under the current circumstances. These treatments are not considered experimental or investigational. The duration and frequency proposed are considered appropriate for the service in accordance with accepted standards of medical practice for the diagnosis and or treatment of the patient's condition and or intended to improve the patient's level of function. These services will be furnished in a setting appropriate to the patient's medical needs and condition. Therefore, I have proposed the following plan:    1. Interventional pain management measures: Patient does not take blood thinners. We had a very extensive conversation regarding different interventional pain management measures. Patient opted for a spinal cord stimulator trial with [facility]. Shahida Cutler will be scheduled for a spinal cord stimulator trial with [facility]. Patient has received formal education from me including risks, benefits, and alternative treatments, as well as detailed information regarding the procedure and specific goals for the trial. Patient has also received didactic materials including  educational booklets and DVDs on spinal cord stimulation therapies. We have also discussed regenerative therapies, PNS, DRG, diagnostic and therapeutic right L4-L5 and right L5-S1 transforaminal epidural steroid injections, to name a few.    2. Diagnostic studies:  A. Flexion and extension X-rays of the lumbar spine to assess lumbar stability  B. MRI of the thoracic spine without contrast to assess capacity and patency of the spinal canal and epidural space prior to spinal cord stimulator trial and implant  C. CBC, PT, PTT prior to SCS trial    3. Pharmacological measures: Reviewed and discussed;   A. Trial with Rheumate one tablet once daily  B. Start pyridoxine 100 mg one tablet by mouth daily take for 30 days, #30, no refills  C. Start alpha lipoid acid 4115-6828 mg per day divided into 3 doses  D. Trial with prilocaine 2%, lidocaine 10%, imipramine 3%, capsaicin 0.001%, and mannitol 20% cream, apply 1 to 2 grams of cream to the affected areas every 4 to 6 hours as needed  E. Trial with duloxetine 20 mg daily for treatment of musculoskeletal pain,  #30, no refills    4. Long-term rehabilitation efforts:  A. The patient does not have a history of falls. I did complete a risk assessment for falls.   B. Patient will start a comprehensive physical therapy program for water therapy, Alter-G, therapeutic exercise, gait and balance training, neurodynamics, E-STIM, myofascial release, cupping, dry needling, and a home exercise program, once her pain is under control  C. Start an exercise program such as yoga, Pilates, water therapy, and swimming  D. I have spent a significant amount of time talking with the patient and providing specific recommendations regarding lifestyle modification, preventive measures, and self-care management of chronic pain.  In addition, I have provided a copy of the booklet Care of the back by Herbie Aponte MD and Rosalina Flores MD to be used as a physician supervised home exercise program  E.  Contrast therapy: Apply ice-packs for 15-20 minutes, followed by heating pads for 15-20 minutes to affected area   F. Referral to Dr. Ned Hastings for psychological screening for spinal cord stimulation and intrathecal therapies.  DAVID. Shahida Cutler  reports that she quit smoking about 12 years ago. Her smoking use included cigarettes. She has a 10.00 pack-year smoking history. She has never used smokeless tobacco.    5. The patient has been instructed to contact my office with any questions or difficulties. The patient understands the plan and agrees to proceed accordingly.    The patient has a documented plan of care to address chronic pain. Shahida Cutler reports a pain score of 7/10.  Given her pain assessment as noted, treatment options were discussed and the following options were decided upon as a follow-up plan to address the patient's pain: continuation of current treatment plan for pain, educational materials on pain management, home exercises and therapy, prescription for non-opiod analgesics, referral to Physical Therapy, referral to specialist for assistance in pain treatment guidance, steroid injections, use of non-medical modalities (ice, heat, stretching and/or behavior modifications), and interventional pain management measures including neuromodulation techniques .              Pain Management Panel           No data to display                 JAYSON query complete. JAYSON reviewed by Benny Cassidy MD.            Orders Placed This Encounter   Procedures    XR Spine Lumbar Flex & Ext     Order Specific Question:   Reason for Exam:     Answer:   spondylolisthesis     Order Specific Question:   Does this patient have a diabetic monitoring/medication delivering device on?     Answer:   No    MRI Thoracic Spine Without Contrast     Scheduling Instructions:      MRI of the thoracic spine without contrast to assess capacity and patency of the spinal canal and epidural space prior to spinal cord  stimulator trial and implant    aPTT    Protime-INR    CBC & Differential     Order Specific Question:   Manual Differential     Answer:   No        Time spent face-to-face with the patient: 55 minutes  Time spent reviewing diagnostic studies, consultation reports, previous treatments, ordering diagnostic studies, referrals, and writing this note: 9 minutes  Total Time: 64 minutes      Please note that portions of this note were completed with a voice recognition program.   Any copied data in any portion of my note has been reviewed by myself and accurate.     The 21st Century Cures Act makes medical notes like this available to patients in the interest of transparency. This is a medical document intended as peer to peer communication. It is written in medical language and may contain abbreviations or verbiage that are unfamiliar. It may appear blunt or direct. Medical documents are intended to carry relevant information, facts as evident, and the clinical opinion of the practitioner.     Benny Cassidy MD    Patient Care Team:  Mirtha Greenwood DO as PCP - General (Family Medicine)  Benny Cassidy MD as Consulting Physician (Pain Medicine)     No orders of the defined types were placed in this encounter.        No future appointments.

## 2023-08-08 ENCOUNTER — OFFICE VISIT (OUTPATIENT)
Dept: PAIN MEDICINE | Facility: CLINIC | Age: 58
End: 2023-08-08
Payer: COMMERCIAL

## 2023-08-08 VITALS
BODY MASS INDEX: 26.22 KG/M2 | DIASTOLIC BLOOD PRESSURE: 86 MMHG | SYSTOLIC BLOOD PRESSURE: 126 MMHG | HEART RATE: 74 BPM | OXYGEN SATURATION: 98 % | HEIGHT: 63 IN | WEIGHT: 148 LBS

## 2023-08-08 DIAGNOSIS — M99.43 CONNECTIVE TISSUE STENOSIS OF NEURAL CANAL OF LUMBAR REGION: ICD-10-CM

## 2023-08-08 DIAGNOSIS — M48.061 LUMBAR STENOSIS WITHOUT NEUROGENIC CLAUDICATION: ICD-10-CM

## 2023-08-08 DIAGNOSIS — M47.816 SPONDYLOSIS OF LUMBAR REGION WITHOUT MYELOPATHY OR RADICULOPATHY: ICD-10-CM

## 2023-08-08 DIAGNOSIS — M54.16 LUMBAR RADICULOPATHY: ICD-10-CM

## 2023-08-08 DIAGNOSIS — Z01.818 PREOP TESTING: ICD-10-CM

## 2023-08-08 DIAGNOSIS — R53.81 PHYSICAL DECONDITIONING: ICD-10-CM

## 2023-08-08 DIAGNOSIS — M54.16 LUMBAR RADICULOPATHY: Primary | ICD-10-CM

## 2023-08-08 DIAGNOSIS — M43.16 SPONDYLOLISTHESIS OF LUMBAR REGION: ICD-10-CM

## 2023-08-08 DIAGNOSIS — M51.37 DEGENERATION OF LUMBAR OR LUMBOSACRAL INTERVERTEBRAL DISC: ICD-10-CM

## 2023-08-08 RX ORDER — DULOXETIN HYDROCHLORIDE 20 MG/1
20 CAPSULE, DELAYED RELEASE ORAL DAILY
Qty: 30 CAPSULE | Refills: 0 | Status: SHIPPED | OUTPATIENT
Start: 2023-08-08

## 2023-08-08 RX ORDER — MULTIVITAMIN WITH IRON
100 TABLET ORAL DAILY
Qty: 30 TABLET | Refills: 0 | Status: SHIPPED | OUTPATIENT
Start: 2023-08-08

## 2023-08-08 RX ORDER — CYANOCOBALAMIN 1000 UG/ML
1000 INJECTION, SOLUTION INTRAMUSCULAR; SUBCUTANEOUS WEEKLY
COMMUNITY
Start: 2023-07-19 | End: 2023-08-14

## 2023-08-08 RX ORDER — ME-TETRAHYDROFOLATE/B12/HRB236 1-1-500 MG
1 CAPSULE ORAL DAILY
Qty: 90 CAPSULE | Refills: 1 | Status: SHIPPED | OUTPATIENT
Start: 2023-08-08

## 2023-08-08 RX ORDER — ST. JOHN'S WORT 300 MG
400 CAPSULE ORAL 3 TIMES DAILY
Qty: 180 CAPSULE | Refills: 5 | Status: SHIPPED | OUTPATIENT
Start: 2023-08-08

## 2023-08-10 ENCOUNTER — TELEPHONE (OUTPATIENT)
Dept: PAIN MEDICINE | Facility: CLINIC | Age: 58
End: 2023-08-10
Payer: COMMERCIAL

## 2023-08-10 NOTE — TELEPHONE ENCOUNTER
Caller: [facility]    Relationship to patient: IMAGING    Best call back number: [phone]    Patient is needing: CALLBACK REGARDING PATIENT IMAGING    UNABLE TO WARM TRANSFER

## 2023-08-21 ENCOUNTER — TELEPHONE (OUTPATIENT)
Dept: PAIN MEDICINE | Facility: CLINIC | Age: 58
End: 2023-08-21
Payer: COMMERCIAL

## 2023-08-21 NOTE — TELEPHONE ENCOUNTER
Patient wanted to know if there is a different Behavioral Health clinic she can use to get her psyche clearance for her SCS trial closer to [place].

## 2023-08-21 NOTE — TELEPHONE ENCOUNTER
Spoke with pt and advised that she can use Dr. Hastings via telehealth, and pt expressed that she's frustrated with the fact that it's 350 questions for Dr. Hastings. I advised that we don't use anyone else, and that we don't have anyone that we use in [place]. Pt understood, and inquired if she'd be able to be implanted this year if she has a successful trial.

## 2023-08-22 ENCOUNTER — TELEPHONE (OUTPATIENT)
Dept: PAIN MEDICINE | Facility: CLINIC | Age: 58
End: 2023-08-22
Payer: COMMERCIAL

## 2023-08-22 NOTE — TELEPHONE ENCOUNTER
"Preoperative requisites for SCS trial:  Patient completed her MRI & XRay at [place]. Patient to bring MRI disc    MRI thoracic spine without contrast and flexion-extension x-rays: Radiology report from [facility], images are not available at this time: MRI of the thoracic spine without contrast on 06/20/2023 revealed multilevel lumbar thoracic spondylosis.  Mild kyphosis of the thoracic spine.  At T7-T8 disc osteophyte complex formation with mild canal stenosis.  T8-T9 disc bulge, left paracentral disc protrusion.  Mild stenosis.  T9-T10 T10-T11: Disc bulge with right paracentral disc protrusion.  Mild canal stenosis.  T11-12: Mild canal stenosis  T12-L1: Mild canal stenosis.  Flexion and extension x-rays of the lumbar spine revealed posttraumatic deformity of the L3 vertebral body.  Multilevel grade 1 listhesis with minimal motion detected    Psychological evaluation with Dr. Ned Hastings on 08/24/2023 by Dr. Ned Hastings\": \"Patient is considered to be an appropriate candidate for a SCS\"       Patient denied taking blood thinners  Pending: CBC, PT, PTT        "

## 2023-08-31 ENCOUNTER — PATIENT MESSAGE (OUTPATIENT)
Dept: PAIN MEDICINE | Facility: CLINIC | Age: 58
End: 2023-08-31
Payer: COMMERCIAL

## 2023-09-01 NOTE — TELEPHONE ENCOUNTER
Admitting order received. Attempted to call report. Nurse to call back.   
LVM for pt inquiring if she would like to move forward with her SCS Trial. Please give the office a call.  
7 L res

## 2023-09-05 ENCOUNTER — TELEPHONE (OUTPATIENT)
Dept: PAIN MEDICINE | Facility: CLINIC | Age: 58
End: 2023-09-05
Payer: COMMERCIAL

## 2023-09-05 NOTE — TELEPHONE ENCOUNTER
----- Message from Shahida Cutler sent at 9/2/2023  2:45 PM EDT -----  Regarding: Spinal Cord Stimulator  Contact: [phone]  Tyshawn Tiwari.  Yes, I would like to move forward with the trial.  I have done the MRI and the last lady I talked to was looking to see if you all had a  from [place] to [place] that could bring it but I didn't hear anything back from her.  I will be there on 9/11 and could stop it by then. I do not have a lab order to complete either.

## 2023-09-05 NOTE — TELEPHONE ENCOUNTER
Called patient and she was scheduled for 9/18 with a 6 am arrival time. Advised patient of NPO status and need for a .    Patient stated she would bring her CD with MRI images on 9/11 when she is [place].

## 2023-09-11 ENCOUNTER — LAB (OUTPATIENT)
Dept: LAB | Facility: HOSPITAL | Age: 58
End: 2023-09-11
Payer: COMMERCIAL

## 2023-09-11 DIAGNOSIS — Z01.818 OTHER SPECIFIED PRE-OPERATIVE EXAMINATION: Primary | ICD-10-CM

## 2023-09-11 LAB
APTT PPP: 32.6 SECONDS (ref 22–39)
BASOPHILS # BLD AUTO: 0.04 10*3/MM3 (ref 0–0.2)
BASOPHILS NFR BLD AUTO: 0.7 % (ref 0–1.5)
DEPRECATED RDW RBC AUTO: 42.9 FL (ref 37–54)
EOSINOPHIL # BLD AUTO: 0.09 10*3/MM3 (ref 0–0.4)
EOSINOPHIL NFR BLD AUTO: 1.5 % (ref 0.3–6.2)
ERYTHROCYTE [DISTWIDTH] IN BLOOD BY AUTOMATED COUNT: 13.2 % (ref 12.3–15.4)
HCT VFR BLD AUTO: 40.7 % (ref 34–46.6)
HGB BLD-MCNC: 13.5 G/DL (ref 12–15.9)
IMM GRANULOCYTES # BLD AUTO: 0.02 10*3/MM3 (ref 0–0.05)
IMM GRANULOCYTES NFR BLD AUTO: 0.3 % (ref 0–0.5)
INR PPP: 0.96 (ref 0.89–1.12)
LYMPHOCYTES # BLD AUTO: 1.52 10*3/MM3 (ref 0.7–3.1)
LYMPHOCYTES NFR BLD AUTO: 25 % (ref 19.6–45.3)
MCH RBC QN AUTO: 29.8 PG (ref 26.6–33)
MCHC RBC AUTO-ENTMCNC: 33.2 G/DL (ref 31.5–35.7)
MCV RBC AUTO: 89.8 FL (ref 79–97)
MONOCYTES # BLD AUTO: 0.53 10*3/MM3 (ref 0.1–0.9)
MONOCYTES NFR BLD AUTO: 8.7 % (ref 5–12)
NEUTROPHILS NFR BLD AUTO: 3.88 10*3/MM3 (ref 1.7–7)
NEUTROPHILS NFR BLD AUTO: 63.8 % (ref 42.7–76)
NRBC BLD AUTO-RTO: 0 /100 WBC (ref 0–0.2)
PLATELET # BLD AUTO: 220 10*3/MM3 (ref 140–450)
PMV BLD AUTO: 12.9 FL (ref 6–12)
PROTHROMBIN TIME: 12.9 SECONDS (ref 12.2–14.5)
RBC # BLD AUTO: 4.53 10*6/MM3 (ref 3.77–5.28)
WBC NRBC COR # BLD: 6.08 10*3/MM3 (ref 3.4–10.8)

## 2023-09-11 PROCEDURE — 85730 THROMBOPLASTIN TIME PARTIAL: CPT

## 2023-09-11 PROCEDURE — 85610 PROTHROMBIN TIME: CPT

## 2023-09-11 PROCEDURE — 85025 COMPLETE CBC W/AUTO DIFF WBC: CPT | Performed by: ANESTHESIOLOGY

## 2023-09-13 DIAGNOSIS — M54.16 LUMBAR RADICULOPATHY: Primary | ICD-10-CM

## 2023-09-15 ENCOUNTER — TELEPHONE (OUTPATIENT)
Dept: PAIN MEDICINE | Facility: CLINIC | Age: 58
End: 2023-09-15
Payer: COMMERCIAL

## 2023-09-15 NOTE — TELEPHONE ENCOUNTER
Spoke with pt and advised her that we were unable to obtain the authorization with the peer to peer. Insurance advised that pt needs to complete at least 1 visit of CBT,  and that we may be able to appeal once pt completes this. Pt understands, and advised she will do the CBT with Dr. Hastings. I called Dr. Mario office and they requested a referral page sent over. Page faxed.

## 2023-10-25 ENCOUNTER — TELEPHONE (OUTPATIENT)
Dept: PAIN MEDICINE | Facility: CLINIC | Age: 58
End: 2023-10-25
Payer: COMMERCIAL

## 2023-10-25 NOTE — TELEPHONE ENCOUNTER
Patient called requesting an update on the status of her referral for an SCS trial. She states she finished CBT with Dr. Hastings. Transferred call to .

## 2024-03-01 DIAGNOSIS — M54.16 LUMBAR RADICULOPATHY: ICD-10-CM

## 2024-03-05 RX ORDER — ME-TETRAHYDROFOLATE/B12/HRB236 1-1-500 MG
1 CAPSULE ORAL DAILY
Qty: 90 CAPSULE | Refills: 1 | Status: SHIPPED | OUTPATIENT
Start: 2024-03-05

## 2024-12-27 DIAGNOSIS — M54.16 LUMBAR RADICULOPATHY: ICD-10-CM

## 2024-12-27 RX ORDER — ME-TETRAHYDROFOLATE/B12/HRB236 1-1-500 MG
1 CAPSULE ORAL DAILY
Qty: 90 CAPSULE | Refills: 1 | Status: SHIPPED | OUTPATIENT
Start: 2024-12-27

## 2025-03-20 ENCOUNTER — OFFICE VISIT (OUTPATIENT)
Dept: INTERNAL MEDICINE | Facility: CLINIC | Age: 60
End: 2025-03-20
Payer: COMMERCIAL

## 2025-03-20 VITALS
HEIGHT: 63 IN | OXYGEN SATURATION: 98 % | TEMPERATURE: 98 F | DIASTOLIC BLOOD PRESSURE: 70 MMHG | WEIGHT: 147 LBS | BODY MASS INDEX: 26.05 KG/M2 | SYSTOLIC BLOOD PRESSURE: 130 MMHG | HEART RATE: 86 BPM

## 2025-03-20 DIAGNOSIS — R10.11 RUQ PAIN: Primary | ICD-10-CM

## 2025-03-20 DIAGNOSIS — E55.9 VITAMIN D DEFICIENCY: ICD-10-CM

## 2025-03-20 DIAGNOSIS — Z00.00 HEALTHCARE MAINTENANCE: ICD-10-CM

## 2025-03-20 DIAGNOSIS — Z87.891 PERSONAL HISTORY OF TOBACCO USE, PRESENTING HAZARDS TO HEALTH: ICD-10-CM

## 2025-03-20 NOTE — PROGRESS NOTES
New Patient Office Visit      Date: 2025   Patient Name: Shahida Cutler  : 1965   MRN: [id number]     Chief Complaint:    Chief Complaint   Patient presents with    R sided pain     Establish care       History of Present Illness: Shahida Cutler is a 59 y.o. female who is here today to establish care.      History of Present Illness  The patient is presenting to establish care.    She has been experiencing persistent RUQ pain for approximately 3 years, with no definitive diagnosis despite undergoing several tests. The pain is constant but occasionally intensifies and radiates to other areas. She has been seen by her previous PCP for this issue. A telehealth consultation was conducted, and laboratory tests were ordered, all of which returned normal results.  She was advised to seek immediate medical attention and get labs done during the next episode of pain. She has been residing in [place] since 2024 and underwent further lab tests on 2025, which also returned normal results. She reports not having typical gallbladder symptoms. A CT scan was performed in 2023, and she has been managing the pain for a year prior to that. She has not identified any specific triggers for the pain, which was particularly severe yesterday, affecting both sides of her back. She describes a sensation of swelling in the affected area. She reports not having any fevers, chills, indigestion, or heartburn. She maintains a careful diet and reports not having any major health issues. Her bowel movements are regular, and she does not experience any respiratory difficulties. She is not currently on any antihypertensive or antidiabetic medications. Her blood pressure today was 130/70, which is higher than her usual reading of 105/70.    She has a history of lower back pain and consulted with Dr. Sainz in . She was prescribed Rheumate, a combination of folate, B12, turmeric, and alpha lipoic acid, which  she reports has significantly improved her condition. She believes this treatment has reduced her overall body inflammation. She takes Rheumate, alpha lipoic acid, vitamin D, a multivitamin 4 to 5 times a week, and magnesium glycinate at night. She uses acyclovir as needed for cold sores but has not required it recently. She was previously recommended Cymbalta by Dr. Guerrero but opted for natural remedies first. She was previously on hormone replacement therapy but discontinued it after being informed by a hormone specialist that she was overdosed. She has not taken levothyroxine for the past 4 years. She has a history of food allergies but can now tolerate some of these foods in moderation. She has noticed a slight elevation in her B12 levels, which she attributes to the Rheumate.    She quit smoking in 2011 and underwent lung cancer screening 2 to 3 years ago. She has a mammogram scheduled for the end of this month. She had a colonoscopy in September 2023, which was normal. Her last Pap smear was in April 2024, and she visits her gynecologist annually for a breast exam and pelvic exam. She has not received any vaccines.      Blood pressure -130/70  BMI -25.98      Subjective      Review of Systems:   Review of Systems   Constitutional:  Negative for chills and fever.   Respiratory:  Negative for shortness of breath.    Gastrointestinal:  Positive for abdominal pain. Negative for constipation, diarrhea, nausea and indigestion.       Past Medical History:   Past Medical History:   Diagnosis Date    Arthritis 2015    Cataract 2024    H/O degenerative disc disease     HL (hearing loss) 2019    Hot flash not due to menopause     Lung nodule 2018    Benign    Multiple food allergies     MAE on CPAP     Vision abnormalities        Past Surgical History:   Past Surgical History:   Procedure Laterality Date    COLONOSCOPY  2017    EYE RADIAL KERATOTOMY      LUNG BIOPSY  2018    MUSCLE BIOPSY  2004       Family History:    Family History   Problem Relation Age of Onset    Hypertension Mother     Anxiety disorder Mother     Arthritis Mother     COPD Mother     Depression Mother     Hearing loss Mother     Heart disease Mother         Stents    Hyperlipidemia Mother     Stroke Mother         At age 63.  Shes 79 now.    Heart failure Father     Heart disease Father         Heart attack    Hyperlipidemia Father     Hypertension Sister     Hyperlipidemia Sister     Cancer Brother         SCC tongue.       Hypertension Brother     Hyperlipidemia Brother     Hypertension Brother     Hyperlipidemia Brother     Early death Maternal Grandfather         Stroke at age 53; resulted in death.    Hyperlipidemia Maternal Grandfather     Stroke Maternal Grandfather     Diabetes Maternal Grandmother     Hearing loss Maternal Grandmother        Social History:   Social History     Socioeconomic History    Marital status:     Number of children: 0   Tobacco Use    Smoking status: Former     Current packs/day: 0.00     Average packs/day: 1 pack/day for 26.0 years (26.0 ttl pk-yrs)     Types: Cigarettes     Start date: 1985     Quit date: 2011     Years since quittin.2    Smokeless tobacco: Never    Tobacco comments:     On & off over 25 years, some spans of 5+ years   Vaping Use    Vaping status: Never Used   Substance and Sexual Activity    Alcohol use: Yes     Alcohol/week: 10.0 standard drinks of alcohol     Types: 10 Cans of beer per week    Drug use: No    Sexual activity: Not Currently     Partners: Male     Birth control/protection: Vasectomy       Medications:     Current Outpatient Medications:     acyclovir (ZOVIRAX) 200 MG capsule, Take  by mouth Daily As Needed., Disp: , Rfl:     Alpha Lipoic Acid 200 MG capsule, Take 400 mg by mouth 3 (Three) Times a Day., Disp: 180 capsule, Rfl: 5    cholecalciferol (VITAMIN D3) 250 MCG (85466 UT) capsule, Take 1 capsule by mouth Daily. 4-5x a week, Disp: , Rfl:     Dietary  "Management Product (Rheumate) capsule, Take 1 capsule by mouth Daily., Disp: 90 capsule, Rfl: 1    Gel Base gel, 2 g 4 (Four) Times a Day. prilocaine 2%, lidocaine 10%, imipramine 3%, capsaicin 0.001% and mannitol 20%, Disp: 240 g, Rfl: 5    Allergies:   Allergies   Allergen Reactions    Egg-Derived Products Anaphylaxis    Corn-Containing Products GI Intolerance    Gluten Meal Irritability    Nuts Swelling    Soybean-Containing Drug Products GI Intolerance       Objective     Physical Exam:  Vital Signs:   Vitals:    03/20/25 1555   BP: 130/70   BP Location: Left arm   Patient Position: Sitting   Cuff Size: Adult   Pulse: 86   Temp: 98 °F (36.7 °C)   TempSrc: Infrared   SpO2: 98%   Weight: 66.7 kg (147 lb)   Height: 160.2 cm (63.07\")   PainSc: 3      Body mass index is 25.98 kg/m².         Physical Exam  Constitutional:       Appearance: Normal appearance.   Eyes:      Extraocular Movements: Extraocular movements intact.      Conjunctiva/sclera: Conjunctivae normal.   Pulmonary:      Effort: Pulmonary effort is normal. No respiratory distress.   Abdominal:      Palpations: Abdomen is soft.      Tenderness: There is abdominal tenderness (RUQ, worsened with deep inspiration).   Skin:     General: Skin is warm and dry.   Neurological:      General: No focal deficit present.      Mental Status: She is alert and oriented to person, place, and time.   Psychiatric:         Mood and Affect: Mood normal.         Behavior: Behavior normal.         Assessment / Plan      Assessment/Plan:   Diagnoses and all orders for this visit:    1. RUQ pain (Primary)  -Multiyear history of intermittent right upper quadrant pain without fever, chills, changes to bowel movements.    -Differential diagnosis includes cholelithiasis, biliary colic, gastritis  - Will obtain gallbladder ultrasound for further investigation.    -     US Gallbladder; Future    2. Healthcare maintenance  Healthcare Maintenance:   Immunizations:  Discussed " recommendations for Tdap, PCV 20, shingles vaccines.  Patient has deferred for now but will consider.    Screening:  Colorectal Screening: Patient reports this was completed done at [facility] in 2023, 10 years follow-up  Pap:  done last April.   Mammogram: gets done in [place], due soon.   CT for Smoker (Age 55-75, 30pk yr):  will order today    Annual Labs: lipid, A1c, CMP ordered today    -     CBC w AUTO Differential; Future  -     Comprehensive metabolic panel; Future  -     Lipid panel; Future  -     Hemoglobin A1c; Future  -     TSH; Future  -     T4, free; Future    3. Vitamin D deficiency  -     Vitamin D,25-Hydroxy; Future         Follow Up:   Return in about 3 months (around 6/20/2025) for Recheck.    Patient or patient representative verbalized consent for the use of Ambient Listening during the visit with  Jazzy Holliday MD for chart documentation. 3/20/2025  16:55 EDT      Jazzy Holliday MD   [facility] [place] [address]

## 2025-03-21 ENCOUNTER — LAB (OUTPATIENT)
Dept: LAB | Facility: HOSPITAL | Age: 60
End: 2025-03-21
Payer: COMMERCIAL

## 2025-03-21 ENCOUNTER — TELEPHONE (OUTPATIENT)
Dept: INTERNAL MEDICINE | Facility: CLINIC | Age: 60
End: 2025-03-21

## 2025-03-21 DIAGNOSIS — Z00.00 HEALTHCARE MAINTENANCE: ICD-10-CM

## 2025-03-21 DIAGNOSIS — E55.9 VITAMIN D DEFICIENCY: ICD-10-CM

## 2025-03-21 LAB
25(OH)D3 SERPL-MCNC: 50.5 NG/ML (ref 30–100)
ALBUMIN SERPL-MCNC: 4.1 G/DL (ref 3.5–5.2)
ALBUMIN/GLOB SERPL: 1.3 G/DL
ALP SERPL-CCNC: 68 U/L (ref 39–117)
ALT SERPL W P-5'-P-CCNC: 11 U/L (ref 1–33)
ANION GAP SERPL CALCULATED.3IONS-SCNC: 12.8 MMOL/L (ref 5–15)
AST SERPL-CCNC: 22 U/L (ref 1–32)
BASOPHILS # BLD AUTO: 0.06 10*3/MM3 (ref 0–0.2)
BASOPHILS NFR BLD AUTO: 1.1 % (ref 0–1.5)
BILIRUB SERPL-MCNC: 0.3 MG/DL (ref 0–1.2)
BUN SERPL-MCNC: 15 MG/DL (ref 6–20)
BUN/CREAT SERPL: 13.6 (ref 7–25)
CALCIUM SPEC-SCNC: 9.9 MG/DL (ref 8.6–10.5)
CHLORIDE SERPL-SCNC: 102 MMOL/L (ref 98–107)
CHOLEST SERPL-MCNC: 243 MG/DL (ref 0–200)
CO2 SERPL-SCNC: 27.2 MMOL/L (ref 22–29)
CREAT SERPL-MCNC: 1.1 MG/DL (ref 0.57–1)
DEPRECATED RDW RBC AUTO: 43.2 FL (ref 37–54)
EGFRCR SERPLBLD CKD-EPI 2021: 58.4 ML/MIN/1.73
EOSINOPHIL # BLD AUTO: 0.24 10*3/MM3 (ref 0–0.4)
EOSINOPHIL NFR BLD AUTO: 4.6 % (ref 0.3–6.2)
ERYTHROCYTE [DISTWIDTH] IN BLOOD BY AUTOMATED COUNT: 13.2 % (ref 12.3–15.4)
GLOBULIN UR ELPH-MCNC: 3.2 GM/DL
GLUCOSE SERPL-MCNC: 87 MG/DL (ref 65–99)
HBA1C MFR BLD: 5.4 % (ref 4.8–5.6)
HCT VFR BLD AUTO: 41.9 % (ref 34–46.6)
HDLC SERPL-MCNC: 73 MG/DL (ref 40–60)
HGB BLD-MCNC: 14.2 G/DL (ref 12–15.9)
IMM GRANULOCYTES # BLD AUTO: 0.01 10*3/MM3 (ref 0–0.05)
IMM GRANULOCYTES NFR BLD AUTO: 0.2 % (ref 0–0.5)
LDLC SERPL CALC-MCNC: 153 MG/DL (ref 0–100)
LDLC/HDLC SERPL: 2.06 {RATIO}
LYMPHOCYTES # BLD AUTO: 1.27 10*3/MM3 (ref 0.7–3.1)
LYMPHOCYTES NFR BLD AUTO: 24.2 % (ref 19.6–45.3)
MCH RBC QN AUTO: 30.3 PG (ref 26.6–33)
MCHC RBC AUTO-ENTMCNC: 33.9 G/DL (ref 31.5–35.7)
MCV RBC AUTO: 89.5 FL (ref 79–97)
MONOCYTES # BLD AUTO: 0.46 10*3/MM3 (ref 0.1–0.9)
MONOCYTES NFR BLD AUTO: 8.8 % (ref 5–12)
NEUTROPHILS NFR BLD AUTO: 3.21 10*3/MM3 (ref 1.7–7)
NEUTROPHILS NFR BLD AUTO: 61.1 % (ref 42.7–76)
NRBC BLD AUTO-RTO: 0 /100 WBC (ref 0–0.2)
PLATELET # BLD AUTO: 241 10*3/MM3 (ref 140–450)
PMV BLD AUTO: 13 FL (ref 6–12)
POTASSIUM SERPL-SCNC: 4.3 MMOL/L (ref 3.5–5.2)
PROT SERPL-MCNC: 7.3 G/DL (ref 6–8.5)
RBC # BLD AUTO: 4.68 10*6/MM3 (ref 3.77–5.28)
SODIUM SERPL-SCNC: 142 MMOL/L (ref 136–145)
T4 FREE SERPL-MCNC: 1.22 NG/DL (ref 0.92–1.68)
TRIGL SERPL-MCNC: 97 MG/DL (ref 0–150)
TSH SERPL DL<=0.05 MIU/L-ACNC: 3.99 UIU/ML (ref 0.27–4.2)
VLDLC SERPL-MCNC: 17 MG/DL (ref 5–40)
WBC NRBC COR # BLD AUTO: 5.25 10*3/MM3 (ref 3.4–10.8)

## 2025-03-21 PROCEDURE — 80050 GENERAL HEALTH PANEL: CPT

## 2025-03-21 PROCEDURE — 84439 ASSAY OF FREE THYROXINE: CPT

## 2025-03-21 PROCEDURE — 82306 VITAMIN D 25 HYDROXY: CPT

## 2025-03-21 PROCEDURE — 80061 LIPID PANEL: CPT

## 2025-03-21 PROCEDURE — 83036 HEMOGLOBIN GLYCOSYLATED A1C: CPT

## 2025-03-21 PROCEDURE — 36415 COLL VENOUS BLD VENIPUNCTURE: CPT

## 2025-03-21 NOTE — TELEPHONE ENCOUNTER
Caller: Shahida Cutler    Relationship: Self    Best call back number: [phone]     What is the medical concern/diagnosis: RUQ PAIN    What specialty or service is being requested: ULTRASOUND    What is the provider, practice or medical service name: [facility]    What is the office location: [facility]    What is the office phone number: [phone]    Any additional details: PATIENT WAS SEEN ON 03/20/25.

## 2025-04-04 DIAGNOSIS — R10.11 RIGHT UPPER QUADRANT ABDOMINAL PAIN: Primary | ICD-10-CM

## 2025-06-12 ENCOUNTER — OFFICE VISIT (OUTPATIENT)
Dept: INTERNAL MEDICINE | Facility: CLINIC | Age: 60
End: 2025-06-12
Payer: COMMERCIAL

## 2025-06-12 ENCOUNTER — LAB (OUTPATIENT)
Dept: LAB | Facility: HOSPITAL | Age: 60
End: 2025-06-12
Payer: COMMERCIAL

## 2025-06-12 VITALS
HEIGHT: 63 IN | OXYGEN SATURATION: 92 % | TEMPERATURE: 97 F | DIASTOLIC BLOOD PRESSURE: 70 MMHG | HEART RATE: 80 BPM | BODY MASS INDEX: 26.05 KG/M2 | WEIGHT: 147 LBS | SYSTOLIC BLOOD PRESSURE: 110 MMHG

## 2025-06-12 DIAGNOSIS — R10.9 FLANK PAIN: Primary | ICD-10-CM

## 2025-06-12 DIAGNOSIS — R10.9 FLANK PAIN: ICD-10-CM

## 2025-06-12 LAB
ANION GAP SERPL CALCULATED.3IONS-SCNC: 14 MMOL/L (ref 5–15)
BACTERIA UR QL AUTO: NORMAL /HPF
BILIRUB UR QL STRIP: NEGATIVE
BUN SERPL-MCNC: 17 MG/DL (ref 6–20)
BUN/CREAT SERPL: 13.8 (ref 7–25)
CALCIUM SPEC-SCNC: 10.3 MG/DL (ref 8.6–10.5)
CHLORIDE SERPL-SCNC: 100 MMOL/L (ref 98–107)
CLARITY UR: CLEAR
CO2 SERPL-SCNC: 27 MMOL/L (ref 22–29)
COLOR UR: YELLOW
CREAT SERPL-MCNC: 1.23 MG/DL (ref 0.57–1)
EGFRCR SERPLBLD CKD-EPI 2021: 51 ML/MIN/1.73
GLUCOSE SERPL-MCNC: 90 MG/DL (ref 65–99)
GLUCOSE UR STRIP-MCNC: NEGATIVE MG/DL
HGB UR QL STRIP.AUTO: NEGATIVE
HYALINE CASTS UR QL AUTO: NORMAL /LPF
KETONES UR QL STRIP: NEGATIVE
LEUKOCYTE ESTERASE UR QL STRIP.AUTO: NEGATIVE
NITRITE UR QL STRIP: NEGATIVE
PH UR STRIP.AUTO: 6.5 [PH] (ref 5–8)
POTASSIUM SERPL-SCNC: 4.6 MMOL/L (ref 3.5–5.2)
PROT UR QL STRIP: NEGATIVE
RBC # UR STRIP: NORMAL /HPF
REF LAB TEST METHOD: NORMAL
SODIUM SERPL-SCNC: 141 MMOL/L (ref 136–145)
SP GR UR STRIP: 1.01 (ref 1–1.03)
SQUAMOUS #/AREA URNS HPF: NORMAL /HPF
UROBILINOGEN UR QL STRIP: NORMAL
WBC # UR STRIP: NORMAL /HPF

## 2025-06-12 PROCEDURE — 99213 OFFICE O/P EST LOW 20 MIN: CPT | Performed by: STUDENT IN AN ORGANIZED HEALTH CARE EDUCATION/TRAINING PROGRAM

## 2025-06-12 PROCEDURE — 81001 URINALYSIS AUTO W/SCOPE: CPT

## 2025-06-12 PROCEDURE — 80048 BASIC METABOLIC PNL TOTAL CA: CPT

## 2025-06-12 PROCEDURE — 36415 COLL VENOUS BLD VENIPUNCTURE: CPT

## 2025-06-12 NOTE — PROGRESS NOTES
Office Note     Name: Shahida Cutler    : 1965     MRN: [id number]     Chief Complaint  RUQ pain (F/u)    Subjective     History of Present Illness:  Shahida Cutler is a 59 y.o. female who presents today for 3-month follow-up.    Answers submitted by the patient for this visit:  Problem not listed (Submitted on 2025)  Chief Complaint: Other medical problem  Reason for appointment: Kidney  anorexia: No  joint pain: Yes  change in stool: No  headaches: No  joint swelling: No  vertigo: No  visual change: No  Onset: 1 to 5 years  Chronicity: chronic  Frequency: constantly  Medications tried: Kidney & liver cleanse      History of Present Illness  The patient is presenting today for a 3-month follow-up.    She has been experiencing persistent abdominal pain, which she initially attributed to her kidneys. The pain is bilateral, with the left side being intermittent and the right side constant. The severity of the pain fluctuates, and it has been present for over 1.5 years without resolution. She reports no hematuria or dysuria. In 2024 or 10/2024, she sought medical attention in [place] due to severe pain, where blood work and urinalysis were performed. She does not believe the pain is muscular in nature.  The pain occasionally disrupts her sleep, forcing her to change positions. She describes the pain as discomfort rather than excruciating. She does not use ibuprofen or Tylenol frequently. She is currently taking Rheumate, a prescription medication containing turmeric, prescribed by Dr. Dawit Sainz from neurology. She has tried muscle relaxers for her cervical and lower back issues but did not find them beneficial for her current pain. She reports no changes in bowel movements, chest pain, or respiratory distress. She is not on any daily medications, only supplements and acyclovir as needed.    She experienced an episode of acid reflux characterized by a burning sensation, which was managed  with Nexium for a month. This treatment resulted in an improvement of her symptoms. She is also on a probiotic regimen.    Review of Systems:   Review of Systems   Constitutional:  Negative for chills, diaphoresis, fatigue and fever.   HENT:  Negative for congestion, sore throat and swollen glands.    Respiratory:  Negative for cough.    Cardiovascular:  Negative for chest pain.   Gastrointestinal:  Positive for abdominal pain. Negative for nausea and vomiting.   Genitourinary:  Negative for dysuria.   Musculoskeletal:  Positive for neck pain. Negative for myalgias.   Skin:  Negative for rash.   Neurological:  Negative for weakness and numbness.       Past Medical History:   Past Medical History:   Diagnosis Date   • Arthritis    • Cataract    • H/O degenerative disc disease    • HL (hearing loss)    • Hot flash not due to menopause    • Lung nodule 2018    Benign   • Multiple food allergies    • MAE on CPAP    • Vision abnormalities        Past Surgical History:   Past Surgical History:   Procedure Laterality Date   • COLONOSCOPY     • EYE RADIAL KERATOTOMY     • LUNG BIOPSY  2018   • MUSCLE BIOPSY  2004       Family History:   Family History   Problem Relation Age of Onset   • Hypertension Mother    • Anxiety disorder Mother    • Arthritis Mother    • COPD Mother    • Depression Mother    • Hearing loss Mother    • Heart disease Mother         Stents   • Hyperlipidemia Mother    • Stroke Mother         At age 63.  Shes 79 now.   • Heart failure Father    • Heart disease Father         Heart attack   • Hyperlipidemia Father    • Hypertension Sister    • Hyperlipidemia Sister    • Cancer Brother         SCC tongue.      • Hypertension Brother    • Hyperlipidemia Brother    • Hypertension Brother    • Hyperlipidemia Brother    • Early death Maternal Grandfather         Stroke at age 53; resulted in death.   • Hyperlipidemia Maternal Grandfather    • Stroke Maternal Grandfather    • Diabetes  "Maternal Grandmother    • Hearing loss Maternal Grandmother        Social History:   Social History     Socioeconomic History   • Marital status:    • Number of children: 0   Tobacco Use   • Smoking status: Former     Current packs/day: 0.00     Average packs/day: 1 pack/day for 26.0 years (26.0 ttl pk-yrs)     Types: Cigarettes     Start date: 1985     Quit date: 2011     Years since quittin.4   • Smokeless tobacco: Never   • Tobacco comments:     On & off over 25 years, some spans of 5+ years   Vaping Use   • Vaping status: Never Used   Substance and Sexual Activity   • Alcohol use: Yes     Alcohol/week: 10.0 standard drinks of alcohol     Types: 10 Cans of beer per week   • Drug use: No   • Sexual activity: Not Currently     Partners: Male     Birth control/protection: Vasectomy       Immunizations:   There is no immunization history on file for this patient.     Medications:     Current Outpatient Medications:   •  acyclovir (ZOVIRAX) 200 MG capsule, Take  by mouth Daily As Needed., Disp: , Rfl:   •  Alpha Lipoic Acid 200 MG capsule, Take 400 mg by mouth 3 (Three) Times a Day., Disp: 180 capsule, Rfl: 5  •  cholecalciferol (VITAMIN D3) 250 MCG (58317 UT) capsule, Take 1 capsule by mouth Daily. 4-5x a week, Disp: , Rfl:   •  Dietary Management Product (Rheumate) capsule, Take 1 capsule by mouth Daily., Disp: 90 capsule, Rfl: 1  •  Gel Base gel, 2 g 4 (Four) Times a Day. prilocaine 2%, lidocaine 10%, imipramine 3%, capsaicin 0.001% and mannitol 20%, Disp: 240 g, Rfl: 5    Allergies:   Allergies   Allergen Reactions   • Egg-Derived Products Anaphylaxis   • Corn-Containing Products GI Intolerance   • Gluten Meal Irritability   • Nuts Swelling   • Soybean-Containing Drug Products GI Intolerance       Objective     Vital Signs  /70 (BP Location: Left arm, Patient Position: Sitting, Cuff Size: Adult)   Pulse 80   Temp 97 °F (36.1 °C) (Infrared)   Ht 160.2 cm (63.07\")   Wt 66.7 kg (147 lb)  "  SpO2 92%   BMI 25.98 kg/m²   Body mass index is 25.98 kg/m².            Physical Exam  Constitutional:       Appearance: Normal appearance.   HENT:      Head: Normocephalic and atraumatic.   Eyes:      Extraocular Movements: Extraocular movements intact.      Conjunctiva/sclera: Conjunctivae normal.   Pulmonary:      Effort: Pulmonary effort is normal. No respiratory distress.   Musculoskeletal:      Comments: Right flank pain   Neurological:      General: No focal deficit present.      Mental Status: She is alert and oriented to person, place, and time.   Psychiatric:         Mood and Affect: Mood normal.         Behavior: Behavior normal.            Assessment and Plan     Assessment/Plan:  Diagnoses and all orders for this visit:    1. Flank pain (Primary)  - Patient reports persistent flank pain pain for about 1-1/2 years primarily on the right side, not believed to be muscular.  - She denies hematuria, fever, chills, changes to bowel movements.  - A repeat kidney function test and urinalysis will be conducted to check for any abnormalities.  - If concerning findings are present, an ultrasound will be considered to examine the kidneys further.   -     Basic metabolic panel; Future  -     Urinalysis With Microscopic - Urine, Clean Catch; Future    Follow Up  Return in about 3 months (around 9/12/2025) for Recheck.    Patient or patient representative verbalized consent for the use of Ambient Listening during the visit with  Jazzy Holliday MD for chart documentation. 6/12/2025  14:42 EDT      Jazzy Holliday MD   [facility] [place] [address]

## 2025-06-13 DIAGNOSIS — N18.31 STAGE 3A CHRONIC KIDNEY DISEASE: Primary | ICD-10-CM

## 2025-06-19 ENCOUNTER — TELEPHONE (OUTPATIENT)
Dept: INTERNAL MEDICINE | Facility: CLINIC | Age: 60
End: 2025-06-19
Payer: COMMERCIAL

## 2025-06-19 NOTE — TELEPHONE ENCOUNTER
ANA WITH [facility] REPORTING THAT PATIENT HAS BEEN SCHEDULED TO BE SEEN 08/19/2025 AT 3:00